# Patient Record
Sex: MALE | Race: AMERICAN INDIAN OR ALASKA NATIVE
[De-identification: names, ages, dates, MRNs, and addresses within clinical notes are randomized per-mention and may not be internally consistent; named-entity substitution may affect disease eponyms.]

---

## 2017-01-31 ENCOUNTER — HOSPITAL ENCOUNTER (INPATIENT)
Dept: HOSPITAL 5 - 3A | Age: 47
LOS: 1 days | Discharge: HOME | DRG: 328 | End: 2017-02-01
Attending: SPECIALIST | Admitting: SPECIALIST
Payer: MEDICAID

## 2017-01-31 DIAGNOSIS — E63.1: ICD-10-CM

## 2017-01-31 DIAGNOSIS — G47.30: ICD-10-CM

## 2017-01-31 DIAGNOSIS — K44.9: ICD-10-CM

## 2017-01-31 DIAGNOSIS — K21.9: Primary | ICD-10-CM

## 2017-01-31 DIAGNOSIS — G43.909: ICD-10-CM

## 2017-01-31 DIAGNOSIS — K30: ICD-10-CM

## 2017-01-31 LAB
CHOLEST SERPL-MCNC: 202 MG/DL (ref 50–199)
HDLC SERPL-MCNC: 103 MG/DL (ref 40–59)
MUCOUS THREADS #/AREA URNS HPF: (no result) /HPF
RBC #/AREA URNS HPF: 2 /HPF (ref 0–6)
TRIGL SERPL-MCNC: 65 MG/DL (ref 2–149)
WBC #/AREA URNS HPF: < 1 /HPF (ref 0–6)

## 2017-01-31 PROCEDURE — 0D164ZA BYPASS STOMACH TO JEJUNUM, PERCUTANEOUS ENDOSCOPIC APPROACH: ICD-10-PCS | Performed by: SPECIALIST

## 2017-01-31 PROCEDURE — 36415 COLL VENOUS BLD VENIPUNCTURE: CPT

## 2017-01-31 PROCEDURE — 84100 ASSAY OF PHOSPHORUS: CPT

## 2017-01-31 PROCEDURE — 0BQR4ZZ: ICD-10-PCS | Performed by: SPECIALIST

## 2017-01-31 PROCEDURE — 5A09357 ASSISTANCE WITH RESPIRATORY VENTILATION, LESS THAN 24 CONSECUTIVE HOURS, CONTINUOUS POSITIVE AIRWAY PRESSURE: ICD-10-PCS | Performed by: SPECIALIST

## 2017-01-31 PROCEDURE — 0BQS4ZZ: ICD-10-PCS | Performed by: SPECIALIST

## 2017-01-31 PROCEDURE — 94660 CPAP INITIATION&MGMT: CPT

## 2017-01-31 PROCEDURE — 81015 MICROSCOPIC EXAM OF URINE: CPT

## 2017-01-31 PROCEDURE — 83735 ASSAY OF MAGNESIUM: CPT

## 2017-01-31 PROCEDURE — 87086 URINE CULTURE/COLONY COUNT: CPT

## 2017-01-31 PROCEDURE — 80053 COMPREHEN METABOLIC PANEL: CPT

## 2017-01-31 PROCEDURE — 80061 LIPID PANEL: CPT

## 2017-01-31 RX ADMIN — HYDROMORPHONE HYDROCHLORIDE PRN MG: 1 INJECTION, SOLUTION INTRAMUSCULAR; INTRAVENOUS; SUBCUTANEOUS at 10:40

## 2017-01-31 RX ADMIN — KETOROLAC TROMETHAMINE SCH MG: 30 INJECTION, SOLUTION INTRAMUSCULAR at 12:00

## 2017-01-31 RX ADMIN — KETOROLAC TROMETHAMINE SCH: 30 INJECTION, SOLUTION INTRAMUSCULAR at 18:35

## 2017-01-31 RX ADMIN — HYDROMORPHONE HYDROCHLORIDE PRN MG: 1 INJECTION, SOLUTION INTRAMUSCULAR; INTRAVENOUS; SUBCUTANEOUS at 10:00

## 2017-01-31 RX ADMIN — HYDROMORPHONE HYDROCHLORIDE PRN MG: 1 INJECTION, SOLUTION INTRAMUSCULAR; INTRAVENOUS; SUBCUTANEOUS at 10:30

## 2017-01-31 RX ADMIN — HYDROCODONE BITARTRATE AND ACETAMINOPHEN PRN MG: 7.5; 325 SOLUTION ORAL at 13:06

## 2017-01-31 RX ADMIN — HYDROMORPHONE HYDROCHLORIDE PRN MG: 1 INJECTION, SOLUTION INTRAMUSCULAR; INTRAVENOUS; SUBCUTANEOUS at 10:10

## 2017-01-31 RX ADMIN — HYDROCODONE BITARTRATE AND ACETAMINOPHEN PRN MG: 7.5; 325 SOLUTION ORAL at 16:30

## 2017-01-31 RX ADMIN — HYDROCODONE BITARTRATE AND ACETAMINOPHEN PRN MG: 7.5; 325 SOLUTION ORAL at 22:59

## 2017-01-31 NOTE — ANESTHESIA CONSULTATION
Anesthesia Consult and Med Hx


Date of service: 01/31/17





- Airway


Anesthetic Teeth Evaluation: Good


ROM Head & Neck: Adequate


Mental/Hyoid Distance: Adequate


Mallampati Class: Class II


Intubation Access Assessment: Probably Good





- Pulmonary Exam


CTA: Yes





- Cardiac Exam


Cardiac Exam: RRR


Anesthetic Concerns: Patient has cervical radiculopathy with pain in LUE. Neck 

extension mildly decreased due to pain.





- Pre-Operative Health Status


ASA Pre-Surgery Classification: ASA3


Proposed Anesthetic Plan: General





- Pulmonary


Hx Sleep Apnea: Yes (8YRS, CPAP, PFT's show mild restrictive and obstructive 

pattern.)





- Gastrointestinal


Hx Gastroesophageal Reflux Disease: Yes

## 2017-02-01 VITALS — DIASTOLIC BLOOD PRESSURE: 84 MMHG | SYSTOLIC BLOOD PRESSURE: 136 MMHG

## 2017-02-01 LAB
ALBUMIN SERPL-MCNC: 3.4 G/DL (ref 3.9–5)
ALBUMIN/GLOB SERPL: 1.4 %
ALP SERPL-CCNC: 80 UNITS/L (ref 35–129)
ALT SERPL-CCNC: 28 UNITS/L (ref 7–56)
ANION GAP SERPL CALC-SCNC: 19 MMOL/L
BILIRUB SERPL-MCNC: 0.5 MG/DL (ref 0.1–1.2)
BUN SERPL-MCNC: 10 MG/DL (ref 9–20)
BUN/CREAT SERPL: 14.28 %
CALCIUM SERPL-MCNC: 8.4 MG/DL (ref 8.4–10.2)
CHLORIDE SERPL-SCNC: 103.2 MMOL/L (ref 98–107)
CO2 SERPL-SCNC: 23 MMOL/L (ref 22–30)
GLUCOSE SERPL-MCNC: 102 MG/DL (ref 75–100)
MAGNESIUM SERPL-MCNC: 1.8 MG/DL (ref 1.7–2.3)
PHOSPHATE SERPL-MCNC: 3.6 MG/DL (ref 2.5–4.5)
POTASSIUM SERPL-SCNC: 3.8 MMOL/L (ref 3.6–5)
PROT SERPL-MCNC: 5.8 G/DL (ref 6.3–8.2)
SODIUM SERPL-SCNC: 141 MMOL/L (ref 137–145)

## 2017-02-01 RX ADMIN — HYDROCODONE BITARTRATE AND ACETAMINOPHEN PRN MG: 7.5; 325 SOLUTION ORAL at 10:00

## 2017-02-01 RX ADMIN — KETOROLAC TROMETHAMINE SCH MG: 30 INJECTION, SOLUTION INTRAMUSCULAR at 01:30

## 2017-02-01 NOTE — DISCHARGE SUMMARY
Providers





- Providers


Date of Admission: 


01/31/17 05:43





Date of discharge: 02/01/17


Attending physician: 


KASSIE LACKEY





Primary care physician: 


PRIMARY CARE MD








Hospitalization


Reason for admission: Post op observation


Condition: Stable


Disposition: DISCHARGED TO HOME OR SELFCARE





Core Measure Documentation





- Palliative Care


Palliative Care/ Comfort Measures: Not Applicable





- Core Measures


Any of the following diagnoses?: none





Exam





- Constitutional


Vitals: 


 











Temp Pulse Resp BP Pulse Ox


 


 98.9 F   80   20   116/78   95 


 


 01/31/17 23:33  01/31/17 23:33  01/31/17 23:33  01/31/17 23:33  02/01/17 02:02











General appearance: Present: no acute distress, well-nourished





- EENT


Eyes: Present: PERRL





- Neck


Neck: Present: supple, normal ROM





- Respiratory


Respiratory effort: normal





- Cardiovascular


Heart Sounds: Present: S1 & S2.  Absent: rub, click





- Abdominal


General gastrointestinal: Present: soft, non-tender (appropriate wound TTP), non

-distended, normal bowel sounds





Plan


Activity: other (no heavy lifting)


Weight Bearing Status: Full Weight Bearing


Diet: other (bariatric stage 1)


Follow up with: 


PRIMARY CARE,MD [Primary Care Provider] - 7 Days

## 2017-06-09 ENCOUNTER — HOSPITAL (OUTPATIENT)
Dept: OTHER | Age: 47
End: 2017-06-09
Attending: EMERGENCY MEDICINE

## 2017-06-09 ENCOUNTER — IMAGING SERVICES (OUTPATIENT)
Dept: OTHER | Age: 47
End: 2017-06-09

## 2022-11-03 NOTE — ADMIT CRITERIA FORM
Admission Criteria Documentation: 





            AMBULATORY SURGERY  EXCEPTION CRITERIA 





Ambulatory Surgery Exception Criteria


                                                                               (

Place 'X' for any and all applicable criteria): 





Surgery or procedure performed on ambulatory basis may require inpatient stay 

for[A] ANY ONE of the following(1)(2)(3)(4)(5)(6)(7)(8)(9):


[X] I.   A preoperative situation, condition, or finding that warrants 

inpatient stay as indicated by ANY  ONE of the following:


   []   a)   Inpatient care needed because of severity of a disease or 

condition rather than the surgery (eg, 


              severe cardiac or respiratory disease, severe infection) (15) (16

) (17) (18)


   []   b)   Emergent procedure (eg, angioplasty for acute ischemia)(19)


   []   c)   Complex surgical approach or situation as indicated by ANY  ONE of 

the following(3):


        []   i)    Open approach needed instead of usual endoscopic, 

transcatheter, or other less invasive procedure


        []   ii)   Difficult approach because of previous operation


        []   iii)  Airway monitoring required after open neck procedures(20)(21)


        []   iv)  Large mass requiring unusually extensive dissection


        []   v)  Additional complicating feature requiring inpatient care (eg, 

drain management)(22(23):


   [X]   d)   Major surgery in a pt with high anesthetic risk as indicated by 

ANY  ONE of the following (2)(3)(5)(7)(8):


        [X]   i)     ASA risk class III or higher (severe systemic disease 

impairing function) [D]


        []   ii)    Advanced age (eg, older than 85 years)(14)(24)             

                   


        []   iii)   Symptomatic heart failure(25)


        []   iv)   Symptomatic asthma or COPD(8)(21)


        []    v)   Morbid obesity with hemodynamic or respiratory problems(20)(

21)(26)(27)


        []   vi)   Obstructive sleep apnea(20)(21)


        []   vii)   Former premature infants who are younger than 60 weeks


        []   viii)  High risk for severe postoperative abnormalities (eg, 

severe postoperative hypocalcemia 


                    after parathyroidectomy for severe hyperparathyroidism)(27)(

28)


        []   ix)   Unstable angina(25) 


   []   e)  Drug-related risk requiring inpatient stay as indicated by ANY  ONE

  of the following(5)(10)(14)(32)(33)


        []   i)     Procedure requires discontinuing drugs or other therapy (eg

, antiarrhythmic    medication,   


                    antiseizure medication), which necessitates inpatient 

observation or treatment.(18)(31)


        []   ii)    Major surgery and high risk drug use as indicated by ANY  

ONE   of the following:


             []     1)    Active abuse of cocaine or similar drug      


             []     2)    Monoamine oxidase inhibitor use


             []     3)    Other drug identified as posing risk


   []   f)   Inadequate outpatient care situation as indicated by ANY  ONE  of 

the following(5)(10)(14)(32)(33)


        []   i)   Patient lives remote from medical facility and procedure has 

urgent complication potential, 


                  and temporary nearby residence cannot be arranged


        []  ii)   Patient will have postprocedure incapacitation and inadequate 

assistance at home, or alternative level of care cannot be arranged.


        []  iii)   Patient will have long general anesthesia or procedure side 

effect resolution time, and 


                  competent person to stay with patient on first postoperative 

night at home or alternative  level of care cannot be arranged.


        []iv)    Other inadequate outpatient situation that cannot be handled 

by other means


[] II.  A perioperative event, condition, or finding that warrants inpatient 

stay as indicated by ANY  ONE of the following (1)(2)(3):        


   []   a)   Inadequate physiologic recovery: cardiovascular,  respiratory, or 

hemodynamic status not normal or near preoperative baseline(18)                

                                                                               

          


   []   b)   Hemodynamic instability


   []   c)   Patient not alert with near normal or baseline mental status


   []   d)   Temperature not normal or as expected and not appropriate for 

outpatient treatment of condition 


   []   e)   Ambulatory or appropriate activity level status not yet achieved 

post procedure [E](34)(35)(36)


   []   f)    Operative site not appropriate (eg, unexpected or excessive 

drainage or bleeding)


   []   g)   Postoperative effects not resolved or adequately managed (eg, 

significant pain or vomiting not 


              appropriate for outpatient or next level of care)(10)(12)


   []   h)   Complicating features requiring inpatient care as indicated by ANY

  ONE  of the following(37):


        []   i)    Severe complications of procedure (eg, bowel injury, airway 

compromise, vascular injury,severe hemorrhage)


        []   ii)   Extensive (eg, dissection far beyond usual scope of procedure

) or prolonged (eg, 120 minutes  


                   beyond usual) surgery needed requiring inpatient 

postoperative care


        []   iii)  Conversion to an open or complex procedure that requires 

inpatient care (eg, open vs 


                   laparoscopic cholecystectomy, abdominal vs vaginal 

hysterectomy)(38)


        []   iv)  Comorbid condition or test result identified during or post 

procedure that requires inpatient care (7)


        []   v)   Malignant hyperthermia(30)


        []   vi)  Other complicating feature requiring inpatient care(22)(23)











Inpatient stay may be needed until ALL of the following are present (1)(2)(3)(4)

(5)(6)(10)(14)(33)(40):


[]a)   Physiologic recovery: cardiovascular, respiratory, and hemodynamic 

status normal or near preoperative baseline


[]b)   Hemodynamic stability    


[]c)   Patient alert, with near normal or baseline mental status


[]d)   Temperature appropriate: patient afebrile or temperature appropriate for 

outpt treatment of condition 


[]e)   Activity level appropriate: ambulatory or appropriate activity level 

post procedure


[]f)   Operative site appropriate as indicated by ALL of the following:


       []i)    Site dry or with expected drainage 


       []ii)   Any blood noted is as expected for procedure.


[]g)  Postoperative effects resolved or managed as indicated by ALL of the 

following:


       []i)    Pain management appropriate for outpatient (or next level of) 

care(10)


       []ii)   Minimal nausea and vomiting: if present, successfully treated 

with oral medication(12)


       []iii)   Headache, dizziness, or drowsiness (if present) are mild.


[]h)  Voiding status acceptable as indicated by ANY  ONE  of the following:


       []i)    Voiding spontaneously    


       []ii)   No voiding but instructions given for follow-up in 6 to 8 hours 


       []iii)  Urinary catheter in place, and instructions given for follow-up


[]i)   Complicating features requiring inpatient care manageable at a lower 

level of care(37)


[]j)   Comorbid conditions manageable at a lower level of care(37)











The original [employer] content created by [employer] has been revised. 


The portions of the content which have been revised are identified through the 

use of italic text or in bold, and [facility] 


has neither reviewed nor approved the modified material. All other unmodified 

content is copyright  [employer].





Please see references footnoted in the original [employer] edition 

2016                                                                    


   


Admission Criteria Met: Yes
03-Nov-2022 17:28